# Patient Record
Sex: MALE | Race: OTHER | Employment: FULL TIME | URBAN - METROPOLITAN AREA
[De-identification: names, ages, dates, MRNs, and addresses within clinical notes are randomized per-mention and may not be internally consistent; named-entity substitution may affect disease eponyms.]

---

## 2021-08-19 ENCOUNTER — HOSPITAL ENCOUNTER (OUTPATIENT)
Age: 41
Discharge: HOME | End: 2021-08-19
Payer: COMMERCIAL

## 2021-08-19 VITALS
HEART RATE: 74 BPM | DIASTOLIC BLOOD PRESSURE: 88 MMHG | WEIGHT: 180 LBS | SYSTOLIC BLOOD PRESSURE: 137 MMHG | TEMPERATURE: 98 F | BODY MASS INDEX: 26.66 KG/M2 | HEIGHT: 69 IN | OXYGEN SATURATION: 100 % | RESPIRATION RATE: 18 BRPM

## 2021-08-19 DIAGNOSIS — Z20.822 LAB TEST NEGATIVE FOR COVID-19 VIRUS: ICD-10-CM

## 2021-08-19 DIAGNOSIS — Z20.822 ENCOUNTER FOR LABORATORY TESTING FOR COVID-19 VIRUS: ICD-10-CM

## 2021-08-19 DIAGNOSIS — J01.10 ACUTE FRONTAL SINUSITIS, RECURRENCE NOT SPECIFIED: Primary | ICD-10-CM

## 2021-08-19 LAB
S PYO AG THROAT QL: NEGATIVE
SARS-COV-2 RNA RESP QL NAA+PROBE: NOT DETECTED

## 2021-08-19 PROCEDURE — U0002 COVID-19 LAB TEST NON-CDC: HCPCS | Performed by: NURSE PRACTITIONER

## 2021-08-19 PROCEDURE — 87880 STREP A ASSAY W/OPTIC: CPT | Performed by: NURSE PRACTITIONER

## 2021-08-19 PROCEDURE — 99203 OFFICE O/P NEW LOW 30 MIN: CPT | Performed by: NURSE PRACTITIONER

## 2021-08-19 RX ORDER — PREDNISONE 20 MG/1
20 TABLET ORAL DAILY
Qty: 5 TABLET | Refills: 0 | Status: SHIPPED | OUTPATIENT
Start: 2021-08-19 | End: 2021-08-24

## 2021-08-19 RX ORDER — AMOXICILLIN 875 MG/1
875 TABLET, COATED ORAL 2 TIMES DAILY
Qty: 14 TABLET | Refills: 0 | Status: SHIPPED | OUTPATIENT
Start: 2021-08-19 | End: 2021-08-26

## 2021-08-19 NOTE — ED PROVIDER NOTES
Patient Seen in: [facility]      History   Patient presents with:  Sinus Problem    Stated Complaint: SINUS CONDITION    HPI/Subjective:   Well-appearing 51-year-old male presents with complaints of nasal congestion for the past 3 weeks.   Pa Maxillary sinus tenderness present. Left Sinus: Maxillary sinus tenderness present. Mouth/Throat:      Mouth: Mucous membranes are moist.      Pharynx: Uvula midline. Posterior oropharyngeal erythema present.      Neck: No cervical lymphadenopathy PM    START taking these medications    amoxicillin 875 MG Oral Tab  Take 1 tablet (875 mg total) by mouth 2 (two) times daily for 7 days. , Normal, Disp-14 tablet, R-0    predniSONE 20 MG Oral Tab  Take 1 tablet (20 mg total) by mouth daily for 5 days. , No

## 2021-08-19 NOTE — ED INITIAL ASSESSMENT (HPI)
Pt here with complaint so sinus congestion, headaches and sore throat that has been going on for 2.5 weeks, pt states the last 2 days he developed chest congestion and a cough now, pt denies any fevers

## 2022-01-03 ENCOUNTER — APPOINTMENT (OUTPATIENT)
Dept: GENERAL RADIOLOGY | Age: 42
End: 2022-01-03
Attending: EMERGENCY MEDICINE
Payer: COMMERCIAL

## 2022-01-03 ENCOUNTER — HOSPITAL ENCOUNTER (OUTPATIENT)
Age: 42
Discharge: HOME | End: 2022-01-03
Payer: COMMERCIAL

## 2022-01-03 VITALS
HEIGHT: 69 IN | BODY MASS INDEX: 26.66 KG/M2 | OXYGEN SATURATION: 100 % | DIASTOLIC BLOOD PRESSURE: 90 MMHG | WEIGHT: 180 LBS | HEART RATE: 73 BPM | TEMPERATURE: 98 F | RESPIRATION RATE: 18 BRPM | SYSTOLIC BLOOD PRESSURE: 134 MMHG

## 2022-01-03 DIAGNOSIS — Z20.822 ENCOUNTER FOR LABORATORY TESTING FOR COVID-19 VIRUS: ICD-10-CM

## 2022-01-03 DIAGNOSIS — R06.02 SHORTNESS OF BREATH: Primary | ICD-10-CM

## 2022-01-03 DIAGNOSIS — J02.0 STREPTOCOCCAL SORE THROAT: ICD-10-CM

## 2022-01-03 LAB
#MXD IC: 0.6 X10ˆ3/UL (ref 0.1–1)
BUN BLD-MCNC: 18 MG/DL (ref 7–18)
CHLORIDE BLD-SCNC: 102 MMOL/L (ref 98–112)
CO2 BLD-SCNC: 24 MMOL/L (ref 21–32)
CREAT BLD-MCNC: 0.8 MG/DL
DDIMER WHOLE BLOOD: <200 NG/ML DDU (ref ?–400)
GLUCOSE BLD-MCNC: 96 MG/DL (ref 70–99)
HCT VFR BLD AUTO: 44.5 %
HCT VFR BLD CALC: 48 %
HGB BLD-MCNC: 14.6 G/DL
ISTAT IONIZED CALCIUM FOR CHEM 8: 1.24 MMOL/L (ref 1.12–1.32)
LYMPHOCYTES # BLD AUTO: 2.7 X10ˆ3/UL (ref 1–4)
LYMPHOCYTES NFR BLD AUTO: 36 %
MCH RBC QN AUTO: 29.6 PG (ref 26–34)
MCHC RBC AUTO-ENTMCNC: 32.8 G/DL (ref 31–37)
MCV RBC AUTO: 90.1 FL (ref 80–100)
MIXED CELL %: 7.6 %
NEUTROPHILS # BLD AUTO: 4.2 X10ˆ3/UL (ref 1.5–7.7)
NEUTROPHILS NFR BLD AUTO: 56.4 %
PLATELET # BLD AUTO: 262 X10ˆ3/UL (ref 150–450)
POTASSIUM BLD-SCNC: 3.6 MMOL/L (ref 3.6–5.1)
RBC # BLD AUTO: 4.94 X10ˆ6/UL
S PYO AG THROAT QL: POSITIVE
SODIUM BLD-SCNC: 140 MMOL/L (ref 136–145)
TROPONIN I BLD-MCNC: <0.02 NG/ML
WBC # BLD AUTO: 7.5 X10ˆ3/UL (ref 4–11)

## 2022-01-03 PROCEDURE — 93000 ELECTROCARDIOGRAM COMPLETE: CPT | Performed by: EMERGENCY MEDICINE

## 2022-01-03 PROCEDURE — 80047 BASIC METABLC PNL IONIZED CA: CPT | Performed by: EMERGENCY MEDICINE

## 2022-01-03 PROCEDURE — 85025 COMPLETE CBC W/AUTO DIFF WBC: CPT | Performed by: EMERGENCY MEDICINE

## 2022-01-03 PROCEDURE — 71046 X-RAY EXAM CHEST 2 VIEWS: CPT | Performed by: EMERGENCY MEDICINE

## 2022-01-03 PROCEDURE — 99214 OFFICE O/P EST MOD 30 MIN: CPT | Performed by: EMERGENCY MEDICINE

## 2022-01-03 PROCEDURE — 87880 STREP A ASSAY W/OPTIC: CPT | Performed by: EMERGENCY MEDICINE

## 2022-01-03 PROCEDURE — 84484 ASSAY OF TROPONIN QUANT: CPT | Performed by: EMERGENCY MEDICINE

## 2022-01-03 RX ORDER — PENICILLIN V POTASSIUM 500 MG/1
500 TABLET ORAL 2 TIMES DAILY
Qty: 20 TABLET | Refills: 0 | Status: SHIPPED | OUTPATIENT
Start: 2022-01-03 | End: 2022-01-13

## 2022-01-03 NOTE — ED PROVIDER NOTES
Patient Seen in: [facility]      History   Patient presents with:  Chest Pain Angina  Cough/URI    Stated Complaint: [phone] sob,cough    Subjective:   HPI  Navya Vizcarra is a 39year old  male here for sob and cough for one day.  Last src Temporal   SpO2 100 %   O2 Device None (Room air)       Current:/90   Pulse 73   Temp 97.9 °F (36.6 °C) (Temporal)   Resp 18   Ht 175.3 cm (5' 9\")   Wt 81.6 kg   SpO2 100%   BMI 26.58 kg/m²         Physical Exam  Vitals and nursing note reviewed Psychiatric:         Mood and Affect: Mood is anxious. Behavior: Behavior normal.         Thought Content:  Thought content normal.         Judgment: Judgment normal.               ED Course     Labs Reviewed   POCT RAPID STREP - Abnormal; Notable reflux symptoms discussed with over-the-counter treatment. OTC meds Tylenol 650mg, or Ibuprofen 600mg as directed on the bottle as long as is no contraindication (ulcers, stomach bleeding, stomach bypass, allergy, kidney disease/failure.     Make sure that Prescribed:  Discharge Medication List as of 1/3/2022  6:57 PM    START taking these medications    penicillin v potassium 500 MG Oral Tab  Take 1 tablet (500 mg total) by mouth 2 (two) times a day for 10 days. , Normal, Disp-20 tablet, R-0

## 2022-01-05 LAB — SARS-COV-2 RNA RESP QL NAA+PROBE: NOT DETECTED

## 2022-08-23 ENCOUNTER — HOSPITAL ENCOUNTER (OUTPATIENT)
Age: 42
Discharge: HOME | End: 2022-08-23
Payer: COMMERCIAL

## 2022-08-23 ENCOUNTER — APPOINTMENT (OUTPATIENT)
Dept: GENERAL RADIOLOGY | Age: 42
End: 2022-08-23
Attending: EMERGENCY MEDICINE
Payer: COMMERCIAL

## 2022-08-23 VITALS
DIASTOLIC BLOOD PRESSURE: 77 MMHG | TEMPERATURE: 98 F | RESPIRATION RATE: 18 BRPM | HEART RATE: 74 BPM | OXYGEN SATURATION: 100 % | SYSTOLIC BLOOD PRESSURE: 127 MMHG

## 2022-08-23 DIAGNOSIS — J98.01 BRONCHOSPASM: Primary | ICD-10-CM

## 2022-08-23 DIAGNOSIS — Z20.822 ENCOUNTER FOR LABORATORY TESTING FOR COVID-19 VIRUS: ICD-10-CM

## 2022-08-23 DIAGNOSIS — R05.9 COUGH: ICD-10-CM

## 2022-08-23 LAB
#MXD IC: 0.6 X10ˆ3/UL (ref 0.1–1)
BUN BLD-MCNC: 21 MG/DL (ref 7–18)
CHLORIDE BLD-SCNC: 104 MMOL/L (ref 98–112)
CO2 BLD-SCNC: 24 MMOL/L (ref 21–32)
CREAT BLD-MCNC: 0.9 MG/DL
DDIMER WHOLE BLOOD: <200 NG/ML DDU (ref ?–400)
GFR SERPLBLD BASED ON 1.73 SQ M-ARVRAT: 109 ML/MIN/1.73M2 (ref 60–?)
GLUCOSE BLD-MCNC: 95 MG/DL (ref 70–99)
HCT VFR BLD AUTO: 42.4 %
HCT VFR BLD CALC: 46 %
HGB BLD-MCNC: 14.3 G/DL
ISTAT IONIZED CALCIUM FOR CHEM 8: 1.15 MMOL/L (ref 1.12–1.32)
LYMPHOCYTES # BLD AUTO: 2.3 X10ˆ3/UL (ref 1–4)
LYMPHOCYTES NFR BLD AUTO: 33.1 %
MCH RBC QN AUTO: 30.4 PG (ref 26–34)
MCHC RBC AUTO-ENTMCNC: 33.7 G/DL (ref 31–37)
MCV RBC AUTO: 90 FL (ref 80–100)
MIXED CELL %: 8.7 %
NEUTROPHILS # BLD AUTO: 4.1 X10ˆ3/UL (ref 1.5–7.7)
NEUTROPHILS NFR BLD AUTO: 58.2 %
PLATELET # BLD AUTO: 291 X10ˆ3/UL (ref 150–450)
POTASSIUM BLD-SCNC: 3.6 MMOL/L (ref 3.6–5.1)
RBC # BLD AUTO: 4.71 X10ˆ6/UL
SARS-COV-2 RNA RESP QL NAA+PROBE: NOT DETECTED
SODIUM BLD-SCNC: 138 MMOL/L (ref 136–145)
TROPONIN I BLD-MCNC: <0.02 NG/ML
WBC # BLD AUTO: 7 X10ˆ3/UL (ref 4–11)

## 2022-08-23 PROCEDURE — 94640 AIRWAY INHALATION TREATMENT: CPT | Performed by: EMERGENCY MEDICINE

## 2022-08-23 PROCEDURE — 99214 OFFICE O/P EST MOD 30 MIN: CPT | Performed by: EMERGENCY MEDICINE

## 2022-08-23 PROCEDURE — 93000 ELECTROCARDIOGRAM COMPLETE: CPT | Performed by: EMERGENCY MEDICINE

## 2022-08-23 PROCEDURE — 80047 BASIC METABLC PNL IONIZED CA: CPT | Performed by: EMERGENCY MEDICINE

## 2022-08-23 PROCEDURE — 84484 ASSAY OF TROPONIN QUANT: CPT | Performed by: EMERGENCY MEDICINE

## 2022-08-23 PROCEDURE — 71046 X-RAY EXAM CHEST 2 VIEWS: CPT | Performed by: EMERGENCY MEDICINE

## 2022-08-23 PROCEDURE — 85025 COMPLETE CBC W/AUTO DIFF WBC: CPT | Performed by: EMERGENCY MEDICINE

## 2022-08-23 PROCEDURE — U0002 COVID-19 LAB TEST NON-CDC: HCPCS | Performed by: EMERGENCY MEDICINE

## 2022-08-23 RX ORDER — PREDNISONE 20 MG/1
TABLET ORAL
Qty: 8 TABLET | Refills: 0 | Status: SHIPPED | OUTPATIENT
Start: 2022-08-26 | End: 2022-08-31

## 2022-08-23 RX ORDER — ALBUTEROL SULFATE 90 UG/1
AEROSOL, METERED RESPIRATORY (INHALATION)
Qty: 1 EACH | Refills: 0 | Status: SHIPPED | OUTPATIENT
Start: 2022-08-23

## 2022-08-23 RX ORDER — IPRATROPIUM BROMIDE AND ALBUTEROL SULFATE 2.5; .5 MG/3ML; MG/3ML
3 SOLUTION RESPIRATORY (INHALATION) ONCE
Status: COMPLETED | OUTPATIENT
Start: 2022-08-23 | End: 2022-08-23

## 2023-03-15 ENCOUNTER — HOSPITAL ENCOUNTER (OUTPATIENT)
Age: 43
Discharge: HOME | End: 2023-03-15
Payer: COMMERCIAL

## 2023-03-15 ENCOUNTER — APPOINTMENT (OUTPATIENT)
Dept: GENERAL RADIOLOGY | Age: 43
End: 2023-03-15
Attending: NURSE PRACTITIONER
Payer: COMMERCIAL

## 2023-03-15 VITALS
TEMPERATURE: 97 F | SYSTOLIC BLOOD PRESSURE: 123 MMHG | OXYGEN SATURATION: 100 % | DIASTOLIC BLOOD PRESSURE: 66 MMHG | HEART RATE: 68 BPM | RESPIRATION RATE: 18 BRPM

## 2023-03-15 DIAGNOSIS — S20.212A RIB CONTUSION, LEFT, INITIAL ENCOUNTER: Primary | ICD-10-CM
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
## 2023-03-15 PROCEDURE — 99213 OFFICE O/P EST LOW 20 MIN: CPT | Performed by: NURSE PRACTITIONER

## 2023-03-15 PROCEDURE — 71101 X-RAY EXAM UNILAT RIBS/CHEST: CPT | Performed by: NURSE PRACTITIONER

## 2023-03-15 RX ORDER — LIDOCAINE 4 G/G
2 PATCH TOPICAL EVERY 24 HOURS
Qty: 14 PATCH | Refills: 2 | Status: SHIPPED | OUTPATIENT
Start: 2023-03-15 | End: 2023-03-22

## 2023-03-15 RX ORDER — NAPROXEN 500 MG/1
500 TABLET ORAL 2 TIMES DAILY PRN
Qty: 20 TABLET | Refills: 0 | Status: SHIPPED | OUTPATIENT
Start: 2023-03-15 | End: 2023-03-25

## 2023-03-15 NOTE — ED INITIAL ASSESSMENT (HPI)
Pt came in due to left side rib pain due to fall that occurred 3 weeks ago. Pt stated he felt and heard \"crunching\" sounds from left side ribs. Pt c/o of sharp pain with movement. Pt stated he ha ria when taking a deep breath. Pt has easy non labored respirations.

## 2024-03-14 ENCOUNTER — APPOINTMENT (OUTPATIENT)
Dept: GENERAL RADIOLOGY | Age: 44
End: 2024-03-14
Attending: NURSE PRACTITIONER
Payer: COMMERCIAL

## 2024-03-14 ENCOUNTER — HOSPITAL ENCOUNTER (OUTPATIENT)
Age: 44
Discharge: HOME | End: 2024-03-14
Payer: COMMERCIAL

## 2024-03-14 VITALS
RESPIRATION RATE: 18 BRPM | SYSTOLIC BLOOD PRESSURE: 129 MMHG | TEMPERATURE: 99 F | HEART RATE: 106 BPM | OXYGEN SATURATION: 98 % | DIASTOLIC BLOOD PRESSURE: 86 MMHG

## 2024-03-14 DIAGNOSIS — J20.9 ACUTE BRONCHITIS, UNSPECIFIED ORGANISM: Primary | ICD-10-CM

## 2024-03-14 DIAGNOSIS — R05.1 ACUTE COUGH: ICD-10-CM

## 2024-03-14 PROCEDURE — 71046 X-RAY EXAM CHEST 2 VIEWS: CPT | Performed by: NURSE PRACTITIONER

## 2024-03-14 PROCEDURE — 99213 OFFICE O/P EST LOW 20 MIN: CPT | Performed by: NURSE PRACTITIONER

## 2024-03-14 RX ORDER — BENZONATATE 200 MG/1
200 CAPSULE ORAL 3 TIMES DAILY PRN
Qty: 15 CAPSULE | Refills: 0 | Status: SHIPPED | OUTPATIENT
Start: 2024-03-14

## 2024-03-14 NOTE — ED INITIAL ASSESSMENT (HPI)
Pt dx with Flu B on Monday and negative for Covid. Last night pt cough started and has been getting worse over the last 12 hours. Pain to chest and throat with cough.  Denies fevers.

## 2024-03-14 NOTE — ED PROVIDER NOTES
Patient Seen in: [facility]      History     Chief Complaint   Patient presents with    Cough     Entered by patient     Stated Complaint: Cough    Subjective:   HPI  Patient is an 44-year-old male that presents to the immediate care center today with concern for cough and congestion that worsened last night. He was dx with influenza 3 days ago. Pt denies known illness exposure.  Pt. has been eating and drinking without difficulty.  He has had no shortness of air; no abdominal or back pain; no headache or dizziness.        Objective:   Past Medical History:   Diagnosis Date    Celiac disease (HCC)     Kawasaki disease (HCC)               Past Surgical History:   Procedure Laterality Date    HERNIA SURGERY                  Social History     Socioeconomic History    Marital status:    Tobacco Use    Smoking status: Never    Smokeless tobacco: Never   Substance and Sexual Activity    Alcohol use: Yes     Comment: Social              Review of Systems   Constitutional:  Negative for appetite change, chills and fever.   HENT:  Positive for congestion. Negative for sinus pressure.    Respiratory:  Positive for cough. Negative for shortness of breath.    Cardiovascular:  Negative for chest pain.   Gastrointestinal:  Negative for abdominal pain.   Musculoskeletal:  Negative for arthralgias and myalgias.   Skin:  Negative for rash.   Neurological:  Negative for dizziness, weakness and headaches.       Positive for stated complaint: Cough  Other systems are as noted in HPI.  Constitutional and vital signs reviewed.      All other systems reviewed and negative except as noted above.    Physical Exam     ED Triage Vitals [03/14/24 1403]   /86   Pulse 106   Resp 18   Temp 99.3 °F (37.4 °C)   Temp src Temporal   SpO2 98 %   O2 Device None (Room air)       Current:/86   Pulse 106   Temp 99.3 °F (37.4 °C) (Temporal)   Resp 18   SpO2 98%         Physical Exam  Vitals and nursing note reviewed.    Constitutional:       General: He is not in acute distress.     Appearance: He is not ill-appearing.   HENT:      Head: Normocephalic and atraumatic.      Nose: Nose normal.   Eyes:      Conjunctiva/sclera: Conjunctivae normal.   Pulmonary:      Effort: Pulmonary effort is normal. No respiratory distress.      Breath sounds: Normal breath sounds.   Musculoskeletal:      Cervical back: Normal range of motion and neck supple.   Skin:     General: Skin is warm and dry.      Findings: No rash.   Neurological:      Mental Status: He is alert and oriented to person, place, and time.               ED Course   Labs Reviewed - No data to display       XR CHEST PA + LAT CHEST (CPT=71046)   Final Result   PROCEDURE: XR CHEST PA + LAT CHEST (CPT=71046)       COMPARISON: [facility] in [place], XR RIBS WITH CHEST (3    VIEWS), LEFT (CPT=71101), 3/15/2023, 8:54 AM.  [facility]    in [place], XR CHEST PA + LAT CHEST (XBS=88298), 8/23/2022, 3:00 PM.     [facility], XR CHEST    PA + LAT CHEST (GIC=79505), 1/03/2022, 6:05 PM.       INDICATIONS: Cough x 3 days.       TECHNIQUE:   Two views.         FINDINGS:    CARDIAC/VASC: The cardiomediastinal silhouette is within normal limits of    size.   MEDIAST/ITZ: No visible mass or adenopathy.    LUNGS/PLEURA: No focal opacity, pleural effusion, or pneumothorax.  There    is no evidence of pulmonary edema.   BONES: No fracture or visible bony lesion.    OTHER: Negative.                     =====   CONCLUSION:        No focal opacity, pleural effusion, or pneumothorax           Dictated by (CST): David Ignacio MD on 3/14/2024 at 2:35 PM        Finalized by (CST): David Ignacio MD on 3/14/2024 at 2:35 PM                               MDM                                         Medical Decision Making  Differential diagnoses considered included, but are not exclusive of: bacterial vs viral sinusitis, dehydration, pneumonia, influenza,  Covid-19 infection, and other viral upper respiratory infection.       Problems Addressed:  Acute bronchitis, unspecified organism: self-limited or minor problem    Amount and/or Complexity of Data Reviewed  Radiology:  Decision-making details documented in ED Course.    Risk  OTC drugs.  Prescription drug management.        Disposition and Plan     Clinical Impression:  1. Acute bronchitis, unspecified organism    2. Acute cough         Disposition:  Discharge  3/14/2024  2:51 pm    Follow-up:  Your primary care provider  Call to schedule appointment to be seen in 5-7 days.    As needed    Annemarie Vasquez MD  [address] [address]  [phone]      As needed          Medications Prescribed:  Current Discharge Medication List        START taking these medications    Details   benzonatate 200 MG Oral Cap Take 1 capsule (200 mg total) by mouth 3 (three) times daily as needed for cough.  Qty: 15 capsule, Refills: 0      Dextromethorphan-guaiFENesin  MG Oral Cap Take 1 tablet by mouth every 12 (twelve) hours as needed (cough and congestion).  Qty: 168 capsule, Refills: 0

## 2025-01-28 ENCOUNTER — APPOINTMENT (OUTPATIENT)
Dept: GENERAL RADIOLOGY | Facility: HOSPITAL | Age: 45
End: 2025-01-28
Attending: EMERGENCY MEDICINE
Payer: COMMERCIAL

## 2025-01-28 ENCOUNTER — APPOINTMENT (OUTPATIENT)
Dept: INTERVENTIONAL RADIOLOGY/VASCULAR | Facility: HOSPITAL | Age: 45
End: 2025-01-28
Attending: INTERNAL MEDICINE
Payer: COMMERCIAL

## 2025-01-28 ENCOUNTER — HOSPITAL ENCOUNTER (OUTPATIENT)
Age: 45
Discharge: ANOTHER HOSPITAL | End: 2025-01-28
Payer: COMMERCIAL

## 2025-01-28 ENCOUNTER — HOSPITAL ENCOUNTER (INPATIENT)
Facility: HOSPITAL | Age: 45
LOS: 1 days | Discharge: HOME | End: 2025-01-29
Attending: EMERGENCY MEDICINE | Admitting: HOSPITALIST
Payer: COMMERCIAL

## 2025-01-28 ENCOUNTER — HOSPITAL ENCOUNTER (OUTPATIENT)
Facility: HOSPITAL | Age: 45
Setting detail: OBSERVATION
Discharge: HOME | End: 2025-01-29
Attending: EMERGENCY MEDICINE | Admitting: HOSPITALIST
Payer: COMMERCIAL

## 2025-01-28 VITALS
HEART RATE: 65 BPM | SYSTOLIC BLOOD PRESSURE: 110 MMHG | DIASTOLIC BLOOD PRESSURE: 57 MMHG | OXYGEN SATURATION: 100 % | TEMPERATURE: 98 F | RESPIRATION RATE: 16 BRPM

## 2025-01-28 DIAGNOSIS — R07.9 ACUTE CHEST PAIN: Primary | ICD-10-CM

## 2025-01-28 DIAGNOSIS — I51.9 LV DYSFUNCTION: ICD-10-CM

## 2025-01-28 DIAGNOSIS — I25.10 CAD S/P PERCUTANEOUS CORONARY ANGIOPLASTY: ICD-10-CM

## 2025-01-28 DIAGNOSIS — Z98.61 CAD S/P PERCUTANEOUS CORONARY ANGIOPLASTY: ICD-10-CM

## 2025-01-28 DIAGNOSIS — R07.9 CHEST PAIN OF UNCERTAIN ETIOLOGY: Primary | ICD-10-CM

## 2025-01-28 DIAGNOSIS — R94.31 ABNORMAL EKG: ICD-10-CM

## 2025-01-28 DIAGNOSIS — I25.2 HISTORY OF ST ELEVATION MYOCARDIAL INFARCTION (STEMI): ICD-10-CM

## 2025-01-28 LAB
ALBUMIN SERPL-MCNC: 5 G/DL (ref 3.2–4.8)
ALBUMIN/GLOB SERPL: 1.9 {RATIO} (ref 1–2)
ALP LIVER SERPL-CCNC: 73 U/L
ALT SERPL-CCNC: 21 U/L
ANION GAP SERPL CALC-SCNC: 12 MMOL/L (ref 0–18)
APTT PPP: 28.5 SECONDS (ref 23–36)
AST SERPL-CCNC: 18 U/L (ref ?–34)
BASOPHILS # BLD AUTO: 0.04 X10(3) UL (ref 0–0.2)
BASOPHILS NFR BLD AUTO: 0.4 %
BILIRUB SERPL-MCNC: 0.7 MG/DL (ref 0.3–1.2)
BUN BLD-MCNC: 15 MG/DL (ref 9–23)
BUN/CREAT SERPL: 14.3 (ref 10–20)
CALCIUM BLD-MCNC: 9.8 MG/DL (ref 8.7–10.4)
CHLORIDE SERPL-SCNC: 103 MMOL/L (ref 98–112)
CO2 SERPL-SCNC: 22 MMOL/L (ref 21–32)
CREAT BLD-MCNC: 1.05 MG/DL
DEPRECATED RDW RBC AUTO: 39 FL (ref 35.1–46.3)
EGFRCR SERPLBLD CKD-EPI 2021: 90 ML/MIN/1.73M2 (ref 60–?)
EOSINOPHIL # BLD AUTO: 0.15 X10(3) UL (ref 0–0.7)
EOSINOPHIL NFR BLD AUTO: 1.6 %
ERYTHROCYTE [DISTWIDTH] IN BLOOD BY AUTOMATED COUNT: 11.9 % (ref 11–15)
FLUAV + FLUBV RNA SPEC NAA+PROBE: NEGATIVE
FLUAV + FLUBV RNA SPEC NAA+PROBE: NEGATIVE
GLOBULIN PLAS-MCNC: 2.7 G/DL (ref 2–3.5)
GLUCOSE BLD-MCNC: 130 MG/DL (ref 70–99)
HCT VFR BLD AUTO: 44.8 %
HGB BLD-MCNC: 15.3 G/DL
IMM GRANULOCYTES # BLD AUTO: 0.02 X10(3) UL (ref 0–1)
IMM GRANULOCYTES NFR BLD: 0.2 %
LYMPHOCYTES # BLD AUTO: 3.33 X10(3) UL (ref 1–4)
LYMPHOCYTES NFR BLD AUTO: 35.7 %
MCH RBC QN AUTO: 30.4 PG (ref 26–34)
MCHC RBC AUTO-ENTMCNC: 34.2 G/DL (ref 31–37)
MCV RBC AUTO: 88.9 FL
MONOCYTES # BLD AUTO: 0.75 X10(3) UL (ref 0.1–1)
MONOCYTES NFR BLD AUTO: 8 %
NEUTROPHILS # BLD AUTO: 5.05 X10 (3) UL (ref 1.5–7.7)
NEUTROPHILS # BLD AUTO: 5.05 X10(3) UL (ref 1.5–7.7)
NEUTROPHILS NFR BLD AUTO: 54.1 %
OSMOLALITY SERPL CALC.SUM OF ELEC: 287 MOSM/KG (ref 275–295)
PLATELET # BLD AUTO: 238 10(3)UL (ref 150–450)
POTASSIUM SERPL-SCNC: 3.6 MMOL/L (ref 3.5–5.1)
PROT SERPL-MCNC: 7.7 G/DL (ref 5.7–8.2)
RBC # BLD AUTO: 5.04 X10(6)UL
RSV RNA SPEC NAA+PROBE: NEGATIVE
SARS-COV-2 RNA RESP QL NAA+PROBE: NOT DETECTED
SODIUM SERPL-SCNC: 137 MMOL/L (ref 136–145)
TROPONIN I SERPL HS-MCNC: 31 NG/L
TROPONIN I SERPL HS-MCNC: 34 NG/L
TROPONIN I SERPL HS-MCNC: 45 NG/L
WBC # BLD AUTO: 9.3 X10(3) UL (ref 4–11)

## 2025-01-28 PROCEDURE — 99215 OFFICE O/P EST HI 40 MIN: CPT | Performed by: PHYSICIAN ASSISTANT

## 2025-01-28 PROCEDURE — 93000 ELECTROCARDIOGRAM COMPLETE: CPT | Performed by: PHYSICIAN ASSISTANT

## 2025-01-28 PROCEDURE — 99222 1ST HOSP IP/OBS MODERATE 55: CPT | Performed by: HOSPITALIST

## 2025-01-28 PROCEDURE — 71045 X-RAY EXAM CHEST 1 VIEW: CPT | Performed by: EMERGENCY MEDICINE

## 2025-01-28 RX ORDER — METOPROLOL SUCCINATE 50 MG/1
25 TABLET, EXTENDED RELEASE ORAL DAILY
COMMUNITY
Start: 2025-01-16

## 2025-01-28 RX ORDER — SPIRONOLACTONE 25 MG/1
25 TABLET ORAL DAILY
COMMUNITY

## 2025-01-28 RX ORDER — HEPARIN SODIUM 1000 [USP'U]/ML
INJECTION, SOLUTION INTRAVENOUS; SUBCUTANEOUS
Status: DISCONTINUED
Start: 2025-01-28 | End: 2025-01-28 | Stop reason: WASHOUT

## 2025-01-28 RX ORDER — TEMAZEPAM 15 MG/1
15 CAPSULE ORAL NIGHTLY PRN
Status: DISCONTINUED | OUTPATIENT
Start: 2025-01-28 | End: 2025-01-29

## 2025-01-28 RX ORDER — HEPARIN SODIUM AND DEXTROSE 10000; 5 [USP'U]/100ML; G/100ML
12 INJECTION INTRAVENOUS ONCE
Status: COMPLETED | OUTPATIENT
Start: 2025-01-28 | End: 2025-01-29

## 2025-01-28 RX ORDER — HEPARIN SODIUM 1000 [USP'U]/ML
60 INJECTION, SOLUTION INTRAVENOUS; SUBCUTANEOUS ONCE
Status: COMPLETED | OUTPATIENT
Start: 2025-01-28 | End: 2025-01-28

## 2025-01-28 RX ORDER — PRASUGREL 10 MG/1
10 TABLET, FILM COATED ORAL DAILY
Status: DISCONTINUED | OUTPATIENT
Start: 2025-01-29 | End: 2025-01-29

## 2025-01-28 RX ORDER — SPIRONOLACTONE 25 MG/1
25 TABLET ORAL DAILY
Status: DISCONTINUED | OUTPATIENT
Start: 2025-01-29 | End: 2025-01-29

## 2025-01-28 RX ORDER — HEPARIN SODIUM AND DEXTROSE 10000; 5 [USP'U]/100ML; G/100ML
INJECTION INTRAVENOUS CONTINUOUS
Status: DISCONTINUED | OUTPATIENT
Start: 2025-01-28 | End: 2025-01-29

## 2025-01-28 RX ORDER — MIDAZOLAM HYDROCHLORIDE 1 MG/ML
INJECTION INTRAMUSCULAR; INTRAVENOUS
Status: DISCONTINUED
Start: 2025-01-28 | End: 2025-01-28 | Stop reason: WASHOUT

## 2025-01-28 RX ORDER — ASPIRIN 81 MG/1
81 TABLET, CHEWABLE ORAL DAILY
COMMUNITY

## 2025-01-28 RX ORDER — METOPROLOL SUCCINATE 25 MG/1
25 TABLET, EXTENDED RELEASE ORAL DAILY
Status: DISCONTINUED | OUTPATIENT
Start: 2025-01-29 | End: 2025-01-29

## 2025-01-28 RX ORDER — PROCHLORPERAZINE EDISYLATE 5 MG/ML
5 INJECTION INTRAMUSCULAR; INTRAVENOUS EVERY 8 HOURS PRN
Status: DISCONTINUED | OUTPATIENT
Start: 2025-01-28 | End: 2025-01-29

## 2025-01-28 RX ORDER — ATORVASTATIN CALCIUM 80 MG/1
80 TABLET, FILM COATED ORAL NIGHTLY
COMMUNITY

## 2025-01-28 RX ORDER — ATORVASTATIN CALCIUM 80 MG/1
80 TABLET, FILM COATED ORAL NIGHTLY
Status: DISCONTINUED | OUTPATIENT
Start: 2025-01-28 | End: 2025-01-29

## 2025-01-28 RX ORDER — PRASUGREL 10 MG/1
10 TABLET, FILM COATED ORAL DAILY
COMMUNITY

## 2025-01-28 RX ORDER — ASPIRIN 81 MG/1
324 TABLET, CHEWABLE ORAL ONCE
Status: COMPLETED | OUTPATIENT
Start: 2025-01-28 | End: 2025-01-28

## 2025-01-28 RX ORDER — ONDANSETRON 2 MG/ML
4 INJECTION INTRAMUSCULAR; INTRAVENOUS EVERY 6 HOURS PRN
Status: DISCONTINUED | OUTPATIENT
Start: 2025-01-28 | End: 2025-01-29

## 2025-01-28 RX ORDER — ACETAMINOPHEN 500 MG
500 TABLET ORAL EVERY 4 HOURS PRN
Status: DISCONTINUED | OUTPATIENT
Start: 2025-01-28 | End: 2025-01-29

## 2025-01-28 RX ORDER — ASPIRIN 81 MG/1
81 TABLET, CHEWABLE ORAL DAILY
Status: DISCONTINUED | OUTPATIENT
Start: 2025-01-29 | End: 2025-01-29

## 2025-01-28 NOTE — ED PROVIDER NOTES
Patient Seen in: [facility]      History     Chief Complaint   Patient presents with    Chest Pain     Entered by patient     Stated Complaint: Chest Pain    Subjective:   HPI    Is a 44-year-old male with history of Kawasaki's disease, celiac disease, coronary artery disease with recent STEMI in [place] 1/2025 status post PCI, left ventricular dysfunction, presenting to immediate care for evaluation of chest pain.  Onset: Today.  Sharp pain.  Midsternal left-sided.  Associated: sensation of feeling fatigue and unwell 4 days ago.  Coming to immediate care for evaluation of chest pain.  Denies any lightheadedness or dizziness or confusion.  Denies current chest pain or shortness of breath.  No diaphoresis.  No nausea or vomiting.  No weakness or confusion.  Compliance with her medication including anticoagulant.  Did not notify cardiologist or primary regarding symptoms.  Here for initial evaluation.      Objective:     Past Medical History:    Celiac disease (HCC)    Heart attack (HCC)    Kawasaki disease (HCC)              Past Surgical History:   Procedure Laterality Date    Hernia surgery                  No pertinent social history.            Review of Systems   Constitutional:  Positive for fatigue. Negative for chills and fever.   Respiratory:  Negative for shortness of breath.    Cardiovascular:  Positive for chest pain. Negative for palpitations and leg swelling.   Gastrointestinal:  Negative for abdominal pain, nausea and vomiting.   Musculoskeletal:  Negative for back pain.   Allergic/Immunologic: Negative for immunocompromised state.   Neurological:  Negative for dizziness, light-headedness and headaches.   Psychiatric/Behavioral:  Negative for confusion.        Positive for stated complaint: Chest Pain  Other systems are as noted in HPI.  Constitutional and vital signs reviewed.      All other systems reviewed and negative except as noted above.    Physical Exam     ED Triage Vitals  [01/28/25 1619]   /57   Pulse 65   Resp 16   Temp 97.7 °F (36.5 °C)   Temp src Oral   SpO2 100 %   O2 Device None (Room air)       Current Vitals:   Vital Signs  BP: 110/57  Pulse: 65  Resp: 16  Temp: 97.7 °F (36.5 °C)  Temp src: Oral    Oxygen Therapy  SpO2: 100 %  O2 Device: None (Room air)        Physical Exam  Vitals and nursing note reviewed.   Constitutional:       General: He is not in acute distress.     Appearance: Normal appearance. He is well-developed. He is not ill-appearing, toxic-appearing or diaphoretic.   HENT:      Head: Normocephalic and atraumatic.      Mouth/Throat:      Mouth: Mucous membranes are moist.   Eyes:      Conjunctiva/sclera: Conjunctivae normal.   Cardiovascular:      Rate and Rhythm: Normal rate and regular rhythm.      Pulses: Normal pulses.   Pulmonary:      Effort: Pulmonary effort is normal. No respiratory distress.   Musculoskeletal:         General: No deformity. Normal range of motion.      Cervical back: Normal range of motion. No rigidity.      Right lower leg: No tenderness. No edema.      Left lower leg: No tenderness. No edema.   Neurological:      General: No focal deficit present.      Mental Status: He is alert and oriented to person, place, and time.      Motor: No weakness.      Gait: Gait normal.   Psychiatric:         Mood and Affect: Mood normal.         Behavior: Behavior normal.           ED Course   Labs Reviewed - No data to display  EKG    Rate, intervals and axes as noted on EKG Report.  Rate: 59  Rhythm: Sinus bradycardia  Reading: Sinus bradycardia, 59 bpm, no STEMI, no block QTc, anterior septal infarct, T wave inversion inferior leads, age undetermined.   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 Prior pt of Dr. Krishnan.    Pt states he was in [place] 1/2025 on vacation, started having chest pain, felt weak an hour later, then had n/v, diaphoresis. Went to 1 ED, had EKG showing STEMI and then transferred to a PCI capable facility.  Had [place] chrome 3.5 x 32 stent to ostial/prox LAD and 3.5 x 18mm [place] Chrome to OM 1  Had post PCI TTE with w/ EF 45%. Does not seem to have any significant valvular disease but TTE report in Japanese.    D/c meds include:  Plavix 75mg qd   Aspirim 100mg qd  Espironolactona 25mg qd  Famotidina 20mg  Neparvis 50mg (sacultitrilo valsartan)  Lipitor 80mg qd  Lopressor 50mg qd        MDM     Patient is 44-year-old male with recent STEMI status post PCI with angioplasty, presenting to immediate care for evaluation of acute chest pain for 1 day with associated of feeling unwell for the last 4 days.  Patient is hemodynamically stable.  Nontachycardic not hypoxic.  Normal tensive.  Appears no acute distress.  Initial evaluation with EKG sinus bradycardia, T wave inversions in V1-V5.  Patient with history of recent PCI with STEMI with occlusion ostial, proximal LAD, OM.  Discussed limitations of immediate care evaluation.  Patient with multiple cardiac risk factors including recent STEMI with current stent placement, acute chest pain, LV dysfunction, history of Kawasaki's.  Requires ER evaluation for acute symptoms.  Patient declining EMS transport.  Patient aware of increased risk of organ dysfunction including sudden death if does not receive prompt evaluation. Patient will go to [facility] hospital for evaluation of symptoms as it is closest to his home.  Discussed calling his cardiologist probably regarding symptoms and requiring prompt ER evaluation      Medical Decision Making      Disposition and Plan     Clinical Impression:  1. Acute chest pain    2. LV dysfunction    3. History of ST elevation  myocardial infarction (STEMI)    4. CAD S/P percutaneous coronary angioplasty    5. Abnormal EKG         Disposition:  Ic to ed  1/28/2025  4:45 pm    Follow-up:  No follow-up provider specified.        Medications Prescribed:  Current Discharge Medication List              Supplementary Documentation:

## 2025-01-28 NOTE — ED PROVIDER NOTES
Patient Seen in: [facility] Emergency Department    History   No chief complaint on file.      HPI    History is provided by patient/independent historian: Patient  44 year old male with history of MI, with stent placement at outside hospital in January, on prasugrel, aspirin, Entresto, here with complaints of intermittent chest pain for the past 3 days.  Today he felt a tightness in his throat.  He went to immediate care where he was recommended to come in by EMS for evaluation, but declined and decided to drive himself here.  He currently has 0 out of 10 pain.    History reviewed.   Past Medical History:    Celiac disease (HCC)    Heart attack (HCC)    Kawasaki disease (HCC)         History reviewed.   Past Surgical History:   Procedure Laterality Date    Hernia surgery           Home Medications reviewed :  Prescriptions Prior to Admission[1]      History reviewed.   Social History     Socioeconomic History    Marital status:    Tobacco Use    Smoking status: Never    Smokeless tobacco: Never   Vaping Use    Vaping status: Never Used   Substance and Sexual Activity    Alcohol use: Yes     Comment: Social    Drug use: Not Currently         ROS  Review of Systems   HENT:          Throat tightness   Respiratory:  Negative for shortness of breath.    Cardiovascular:  Positive for chest pain.   All other systems reviewed and are negative.     All other pertinent organ systems are reviewed and are negative.      Physical Exam     ED Triage Vitals   BP 01/28/25 1721 122/89   Pulse 01/28/25 1721 118   Resp 01/28/25 1721 15   Temp 01/28/25 1728 98.1 °F (36.7 °C)   Temp src 01/28/25 1728 Temporal   SpO2 01/28/25 1721 98 %   O2 Device 01/28/25 1715 None (Room air)     Vital signs reviewed.      Physical Exam  Vitals and nursing note reviewed.   Cardiovascular:      Pulses: Normal pulses.   Pulmonary:      Effort: No respiratory distress.   Abdominal:      General: There is no distension.   Neurological:       Mental Status: He is alert.         ED Course       Labs:     Labs Reviewed   COMP METABOLIC PANEL (14) - Abnormal; Notable for the following components:       Result Value    Glucose 130 (*)     Albumin 5.0 (*)     All other components within normal limits   TROPONIN I HIGH SENSITIVITY - Normal   PTT, ACTIVATED - Normal   SARS-COV-2/FLU A AND B/RSV BY PCR (GENEXPERT) - Normal    Narrative:     This test is intended for the qualitative detection and differentiation of SARS-CoV-2, influenza A, influenza B, and respiratory syncytial virus (RSV) viral RNA in nasopharyngeal or nares swabs from individuals suspected of respiratory viral infection consistent with COVID-19 by their healthcare provider. Signs and symptoms of respiratory viral infection due to SARS-CoV-2, influenza, and RSV can be similar.                                    Test performed using the Xpert Xpress SARS-CoV-2/FLU/RSV (real time RT-PCR)  assay on the [employer] instrument, [address], [place] [address].                   This test is being used under the Food and Drug Administration's Emergency Use Authorization.                                    The authorized Fact Sheet for Healthcare Providers for this assay is available upon request from the laboratory.   CBC WITH DIFFERENTIAL WITH PLATELET   RAINBOW DRAW BLUE   ED/MRSA SCREEN BY PCR-CC         My EKG Interpretation: EKG    Rate, intervals and axes as noted on EKG Report.  Rate: 103  Rhythm: Sinus Rhythm PVC  Reading: abnormal for rate, abnormal for rhythm, ST depression inferolaterally           As reviewed and Interpreted by me      Imaging Results Available and Reviewed while in ED:   No results found.  My review and independent interpretation of xr images: no infiltrate. Radiology report corroborates this in addition to other details as reported by them.      Decision rules/scores evaluated: none      Diagnostic labs/tests considered but not ordered: none    ED Medications Administered:    Medications   heparin (Porcine) 39889 units/250mL infusion ED INITIAL DOSE (12 Units/kg/hr × 78.2 kg Intravenous New Bag 1/28/25 1800)   heparin (Porcine) 50747 units/250mL infusion ACS/AFIB CONTINUOUS (has no administration in time range)   aspirin chewable tab 324 mg (324 mg Oral Given 1/28/25 1733)   heparin (Porcine) 1000 UNIT/ML injection - BOLUS IV 4,700 Units (4,700 Units Intravenous Given 1/28/25 1759)                MDM       Medical Decision Making      Differential Diagnosis: After obtaining the patient's history, performing the physical exam and reviewing the diagnostics, multiple initial diagnoses were considered based on the presenting problem including STEmi, Anxiety, viral syndrome, influenza    External document review: I personally reviewed available external medical records for any recent pertinent discharge summaries, testing, and procedures - the findings are as follows: today visit with BOO Plummer at  for chest pain    Complicating Factors: The patient already  has a past medical history of Celiac disease (HCC), Heart attack (HCC), and Kawasaki disease (HCC). to contribute to the complexity of this ED evaluation.    Procedures performed: none    Discussed management with physician/appropriate source: Dr. Lennon - agrees with cancelling cardiac alert, recommending heparin gtt and admission, Dr. Colon    Considered admission/deescalation of care for: chest pain    Social determinants of health affecting patient care: none    Prescription medications considered: discussed continuing current medication regimen    The patient requires continuous monitoring for: chest pain    Shared decision making: discussed possible admission    Critical Care Time:  Total critical care time for this patient was 30 minutes exclusive of separately billable procedures, but in obtaining history, performing a physical exam, bedside monitoring of interventions, collecting and interpreting test, and discussion  with consultants.        Disposition and Plan     Clinical Impression:  1. Chest pain of uncertain etiology        Disposition:  Admit    Follow-up:  No follow-up provider specified.    Medications Prescribed:  Current Discharge Medication List          Hospital Problems       Present on Admission  Date Reviewed: 3/14/2024            ICD-10-CM Noted POA    * (Principal) Chest pain of uncertain etiology R07.9 1/28/2025 Unknown                     [1] (Not in a hospital admission)

## 2025-01-28 NOTE — ED INITIAL ASSESSMENT (HPI)
Pt states that he felt anxious the last three days. No chest pain. 2 stents placed on January 2nd of 2025 and MI. Pt denies any chest pain, SOB, and dizziness

## 2025-01-28 NOTE — ED INITIAL ASSESSMENT (HPI)
Pt came in due to mid-sternal chest pain for the past 3 days. Pt has hx of recent STEMI. Pt has easy non labored respirations.

## 2025-01-29 ENCOUNTER — APPOINTMENT (OUTPATIENT)
Dept: CV DIAGNOSTICS | Facility: HOSPITAL | Age: 45
End: 2025-01-29
Attending: HOSPITALIST
Payer: COMMERCIAL

## 2025-01-29 VITALS
DIASTOLIC BLOOD PRESSURE: 70 MMHG | HEIGHT: 69 IN | SYSTOLIC BLOOD PRESSURE: 105 MMHG | TEMPERATURE: 99 F | BODY MASS INDEX: 25.53 KG/M2 | WEIGHT: 172.38 LBS | RESPIRATION RATE: 17 BRPM | HEART RATE: 90 BPM | OXYGEN SATURATION: 99 %

## 2025-01-29 LAB
ANION GAP SERPL CALC-SCNC: 11 MMOL/L (ref 0–18)
APTT PPP: 53.4 SECONDS (ref 23–36)
APTT PPP: 53.8 SECONDS (ref 23–36)
ATRIAL RATE: 103 BPM
ATRIAL RATE: 59 BPM
BASOPHILS # BLD AUTO: 0.04 X10(3) UL (ref 0–0.2)
BASOPHILS NFR BLD AUTO: 0.6 %
BUN BLD-MCNC: 12 MG/DL (ref 9–23)
BUN/CREAT SERPL: 13.5 (ref 10–20)
CALCIUM BLD-MCNC: 9.8 MG/DL (ref 8.7–10.4)
CHLORIDE SERPL-SCNC: 107 MMOL/L (ref 98–112)
CO2 SERPL-SCNC: 23 MMOL/L (ref 21–32)
CREAT BLD-MCNC: 0.89 MG/DL
DEPRECATED RDW RBC AUTO: 38.6 FL (ref 35.1–46.3)
EGFRCR SERPLBLD CKD-EPI 2021: 108 ML/MIN/1.73M2 (ref 60–?)
EOSINOPHIL # BLD AUTO: 0.12 X10(3) UL (ref 0–0.7)
EOSINOPHIL NFR BLD AUTO: 1.7 %
ERYTHROCYTE [DISTWIDTH] IN BLOOD BY AUTOMATED COUNT: 11.7 % (ref 11–15)
GLUCOSE BLD-MCNC: 102 MG/DL (ref 70–99)
HCT VFR BLD AUTO: 42.8 %
HGB BLD-MCNC: 14.3 G/DL
IMM GRANULOCYTES # BLD AUTO: 0.02 X10(3) UL (ref 0–1)
IMM GRANULOCYTES NFR BLD: 0.3 %
LYMPHOCYTES # BLD AUTO: 2.58 X10(3) UL (ref 1–4)
LYMPHOCYTES NFR BLD AUTO: 35.5 %
MCH RBC QN AUTO: 30 PG (ref 26–34)
MCHC RBC AUTO-ENTMCNC: 33.4 G/DL (ref 31–37)
MCV RBC AUTO: 89.9 FL
MONOCYTES # BLD AUTO: 0.56 X10(3) UL (ref 0.1–1)
MONOCYTES NFR BLD AUTO: 7.7 %
MRSA DNA SPEC QL NAA+PROBE: NEGATIVE
NEUTROPHILS # BLD AUTO: 3.94 X10 (3) UL (ref 1.5–7.7)
NEUTROPHILS # BLD AUTO: 3.94 X10(3) UL (ref 1.5–7.7)
NEUTROPHILS NFR BLD AUTO: 54.2 %
OSMOLALITY SERPL CALC.SUM OF ELEC: 292 MOSM/KG (ref 275–295)
P AXIS: 53 DEGREES
P AXIS: 70 DEGREES
P-R INTERVAL: 172 MS
P-R INTERVAL: 176 MS
PLATELET # BLD AUTO: 211 10(3)UL (ref 150–450)
POTASSIUM SERPL-SCNC: 3.8 MMOL/L (ref 3.5–5.1)
Q-T INTERVAL: 368 MS
Q-T INTERVAL: 450 MS
QRS DURATION: 98 MS
QRS DURATION: 98 MS
QTC CALCULATION (BEZET): 445 MS
QTC CALCULATION (BEZET): 482 MS
R AXIS: -26 DEGREES
R AXIS: 10 DEGREES
RBC # BLD AUTO: 4.76 X10(6)UL
SODIUM SERPL-SCNC: 141 MMOL/L (ref 136–145)
T AXIS: 103 DEGREES
T AXIS: 91 DEGREES
TROPONIN I SERPL HS-MCNC: 36 NG/L
VENTRICULAR RATE: 103 BPM
VENTRICULAR RATE: 59 BPM
WBC # BLD AUTO: 7.3 X10(3) UL (ref 4–11)

## 2025-01-29 PROCEDURE — 99239 HOSP IP/OBS DSCHRG MGMT >30: CPT | Performed by: INTERNAL MEDICINE

## 2025-01-29 PROCEDURE — 93306 TTE W/DOPPLER COMPLETE: CPT | Performed by: HOSPITALIST

## 2025-01-29 RX ORDER — METOPROLOL SUCCINATE 25 MG/1
25 TABLET, EXTENDED RELEASE ORAL DAILY
Status: DISCONTINUED | OUTPATIENT
Start: 2025-01-29 | End: 2025-01-29

## 2025-01-29 NOTE — ED QUICK NOTES
Orders for admission, patient is aware of plan and ready to go upstairs. Any questions, please call ED RN Carolina at extension [phone].     Patient Covid vaccination status: Unvaccinated     COVID Test Ordered in ED: Rapid SARS-CoV-2 by PCR    COVID Suspicion at Admission: N/A    Running Infusions:    continuous dose heparin          Mental Status/LOC at time of transport: a&ox4    Other pertinent information:   CIWA score: N/A   NIH score:  N/A

## 2025-01-29 NOTE — CONSULTS
Cardiology Consultation  [facility]    Brian Gary Patient Status:  Inpatient    1980 MRN [id number]   Location [facility] 2W/SW Attending Kamlesh Todd MD   Hosp Day # 1 PCP Ling Ramos     Reason for Consultation:  CAD    History of Present Illness:  Brian Gary is a a(n) 44 year old male with pmh Kawasaki disease, celiac disease and CAD s/p recent STEMI 2025 while vacationing in [place] s/p PCI LAD and OM presenting with multiple complaints.  Patient states for the past several days he has been feeling generally anxious with associated elevated BP.  Notes sensation of frog in his throat yesterday.  States symptoms at time of MI were central chest squeezing/tightness with radiation to jaw and left arm with associated diaphoresis and nausea.  States current symptoms are completely different than symptoms at time of MI.  Denies chest pain, palpitations, dyspnea, orthopnea, PND or LE edema.  Nonsmoker.  No family h/o premature CAD.    On admission patient afeb, , /89.  Trop negative x 4, ECG with stigma of recent MI.  Cardial alert initially called and then canceled, cardiology consulted for further eval and management.     History:  Past Medical History:    Celiac disease (HCC)    Heart attack (HCC)    Kawasaki disease (HCC)     Past Surgical History:   Procedure Laterality Date    Hernia surgery       Family History   Problem Relation Age of Onset    Kidney Disease Mother       reports that he has never smoked. He has never used smokeless tobacco. He reports current alcohol use. He reports that he does not currently use drugs.    Allergies:  Allergies[1]    Medications:  Medications Ordered Prior to Encounter[2]    Review of Systems:  Constitutional: denies fevers, chills, night sweats  HEENT: denies headache, vision changes, trouble or pain with swallowing  Cardiac: denies chest pain, palpitations, edema  Pulm: denies dyspnea, cough, wheeze  GI: denies n/v, abd pain,  diarrhea or constipation  : denies hematuria, dysuria, incontinence  MSK: denies muscle or joint pains  Neuro: denies numbness, weakness, paresthesias  Psych: + anxiety  Integument: denies skin rashes or lesions  Heme: denies easy bruising or bleeding  Endo: denies heat/cold intolerance, skin or nail changes      Physical Exam:  Blood pressure 112/78, pulse 71, temperature 98.6 °F (37 °C), temperature source Temporal, resp. rate 14, height 5' 9\" (1.753 m), weight 172 lb 6.4 oz (78.2 kg), SpO2 95%.  Wt Readings from Last 3 Encounters:   01/28/25 172 lb 6.4 oz (78.2 kg)   01/03/22 180 lb (81.6 kg)   08/19/21 180 lb (81.6 kg)       General: awake, alert, oriented x 3, no acute distress  HEENT: at/nc, perrl, eomi  Neck: No JVD, carotids 2+ no bruits.  Cardiac: Regular rate and rhythm, S1, S2 normal, no murmur, rub or gallop.  Lungs: Clear without wheezes, rales, rhonchi or dullness.  Normal excursions and effort.  Abdomen: Soft, non-tender, non-distended, normal bowel sounds   Extremities: Without clubbing, cyanosis or edema.  Peripheral pulses are 2+.  Neurologic: Alert and oriented, normal affect.  Psych: normal mood and affect  Skin: Warm and dry.       Laboratories and Data:  Diagnostics:      Labs:   CBC:    Lab Results   Component Value Date    WBC 7.3 01/29/2025    WBC 9.3 01/28/2025    WBC 5.4 12/29/2014     Lab Results   Component Value Date    HEMOGLOBIN 14.3 08/23/2022    HEMOGLOBIN 14.6 01/03/2022    HGB 14.3 01/29/2025    HGB 15.3 01/28/2025    HGB 14.6 12/29/2014      Lab Results   Component Value Date    .0 01/29/2025    .0 01/28/2025     12/29/2014     BMP:   No results found for: \"GLUCOSE\"  Lab Results   Component Value Date    K 3.8 01/29/2025    K 3.6 01/28/2025    K 4.2 12/29/2014     Lab Results   Component Value Date    BUN 12 01/29/2025    BUN 15 01/28/2025    BUN 16 12/29/2014     Lab Results   Component Value Date    CREATSERUM 0.89 01/29/2025    CREATSERUM 1.05 01/28/2025     CREATSERUM 0.84 12/29/2014     Cholesterol:     Lab Results   Component Value Date    CHOLEST 197 12/29/2014     Lab Results   Component Value Date    HDL 54 12/29/2014     Lab Results   Component Value Date    TRIG 36 12/29/2014     Lab Results   Component Value Date     (H) 12/29/2014     Lab Results   Component Value Date    AST 18 01/28/2025    AST 14 (L) 12/29/2014     Lab Results   Component Value Date    ALT 21 01/28/2025    ALT 13 (L) 12/29/2014       Assessment/Plan:  44 year old male presenting with:    1) Malaise/anxiety  2) CAD s/p STEMI 1/2025 in [place] with PCI to LAD and OM  3) ICM (EF reported 45%)  4) Kawasaki disease  5) Celiac disease    - Patient presents feeling generally unwell/anxious for several days, symptoms distinctly difference from those at time of MI and PCI  - Trop negative x 4, ECG with SR and changes consistent with recent anterolateral MI  - TTE ordered and pending to assess LV structure/function  - Cont asa, prasugrel, statin, bb, arni, mra  - Discussed with patient that clinical presentation is not consistent with acute stent failure/ST (trop negative, patient comfortable and without symptoms); did offer stress testing if patient is so inclined, but objective testing thus far reassuring and patient asymptomatic since admission.  Patient and spouse declining stress test at this time  - Cardiac rehab referral  - Monitor on tele  - Will follow    Valentino Escobedo MD  Interventional cardiologist, [facility]  1/29/2025  1:45 PM         [1]   Allergies  Allergen Reactions    Gluten Meal NAUSEA AND VOMITING     Celiac    Other OTHER (SEE COMMENTS)     CATS   [2]   No current facility-administered medications on file prior to encounter.     Current Outpatient Medications on File Prior to Encounter   Medication Sig Dispense Refill    aspirin 81 MG Oral Chew Tab Chew 1 tablet (81 mg total) by mouth daily.      atorvastatin 80 MG Oral Tab Take 1 tablet (80 mg total) by mouth  nightly.      metoprolol succinate ER 50 MG Oral Tablet 24 Hr Take 0.5 tablets (25 mg total) by mouth daily.      prasugrel 10 MG Oral Tab Take 1 tablet (10 mg total) by mouth daily.      sacubitril-valsartan 49-51 MG Oral Tab Take 1 tablet by mouth 2 (two) times daily.      spironolactone 25 MG Oral Tab Take 1 tablet (25 mg total) by mouth daily.

## 2025-01-29 NOTE — PROGRESS NOTES
[facility]     Brian Gary Patient Status:  Inpatient    1980 MRN [id number]   Location [facility] 2W/SW Attending Kamlesh Todd MD   Hosp Day # 1 PCP Ling Ramos     Subjective:     Patient resting comfortably and in NAD. Denied any active complaints.   All questions and concerns addressed.      Objective:    Review of Systems:   ROS completed; pertinent positive and negatives stated in subjective.      Vital signs:  Temp:  [97.7 °F (36.5 °C)-99.1 °F (37.3 °C)] 99 °F (37.2 °C)  Pulse:  [] 68  Resp:  [11-21] 14  BP: ()/(55-89) 110/69  SpO2:  [94 %-100 %] 97 %      Physical Exam:    Gen: NAD AO x3  Chest: good air entry CTABL  CVS: normal s1 and s2 RR  Abd: NABS soft NT ND  Neuro: CN 2-12 grossly intact  Ext: no edema in bilateral LE      Diagnostic Data:    Labs:  Recent Labs   Lab 25  1719 25  0300   WBC 9.3 7.3   HGB 15.3 14.3   MCV 88.9 89.9   .0 211.0       Recent Labs   Lab 25  1719 25  0300   * 102*   BUN 15 12   CREATSERUM 1.05 0.89   CA 9.8 9.8   ALB 5.0*  --     141   K 3.6 3.8    107   CO2 22.0 23.0   ALKPHO 73  --    AST 18  --    ALT 21  --    BILT 0.7  --    TP 7.7  --        Estimated Creatinine Clearance: 105.9 mL/min (based on SCr of 0.89 mg/dL).    No results for input(s): \"PTP\", \"INR\" in the last 168 hours.           Imaging: Imaging data reviewed in Epic.    Medications:    metoprolol succinate ER  25 mg Oral Daily    aspirin  81 mg Oral Daily    atorvastatin  80 mg Oral Nightly    sacubitril-valsartan  1 tablet Oral BID    spironolactone  25 mg Oral Daily    prasugrel  10 mg Oral Daily       Assessment & Plan:     Generalized weakness  CAD s/p PCI  Imaging reviewed  Trop and EKG reviewed  Echo pending  Remains on heparin drip  Continue home meds  Cardiology on consult  Appreciate recs  HTN  HLD  Continue home meds      Plan of care discussed with  patient or family at bedside.      Supplementary Documentation:     Quality:  DVT Prophylaxis: Heparin   CODE status:       Estimated date of discharge: TBD  Discharge is dependent on: clinical stability  At this point Mr. Gary is expected to be discharge to: home                   Kamlesh Todd MD  Hospitalist    MDM: High, I personally reviewed the available laboratories, imaging including XR. I discussed the case with RN. I ordered laboratories, studies including AM labs.  Medical decision making high, risk is high.       The 21st Century Cures Act makes medical notes like these available to patients in the interest of transparency. Please be advised this is a medical document. Medical documents are intended to carry relevant information, facts as evident, and the clinical opinion of the practitioner. The medical note is intended as peer to peer communication and may appear blunt or direct. It is written in medical language and may contain abbreviations or verbiage that are unfamiliar.

## 2025-01-29 NOTE — DISCHARGE SUMMARY
[facility]  part of [facility]    DISCHARGE SUMMARY     Brian Gary Patient Status:  Inpatient    1980 MRN [id number]   Location [facility] 2W/SW Attending Kamlesh Todd MD   Hosp Day # 1 PCP Ling Ramos     Date of Admission: 2025  Date of Discharge:  2025    Discharge Disposition: Acute Care Short Term Hospital    Discharge Diagnosis:     Generalized weakness  CAD s/p PCI    History of Present Illness:     The patient is a 44-year-old  male with recent history of acute coronary syndrome while in [place], required intervention with 2 PCI stents early this month in 2025. Patient came in today to the emergency department because he has not been feeling well and fatigued for last 3 to 4 days. Initially at the urgent care center, he was found to have Q waves in the anterolateral leads. Sent to the emergency department for evaluation. EKG showed left axis deviation and PVCs with question of ST elevation. Initially cardiac alert was announced then it was canceled. CBC, chemistry, troponin were unremarkable. Chest x-ray showed no acute findings. Patient was initiated on IV heparin, and he will be kept for observation overnight.     Brief Synopsis:     Generalized weakness  CAD s/p PCI  Imaging reviewed  Trop and EKG reviewed  Echo pending  Remains on heparin drip  Continue home meds  Cardiology on consult  Close opt follow up  Cardiac rehab as opt  Patient declined stress testing during hospital stay  Patient will follow up with PCP and Cardiology as opt  Patient is to remain compliant with all discharge medications and instructions and to follow up as advised.   Patient encouraged to make healthy lifestyle and dietary changes.    Lace+ Score: 30  59-90 High Risk  29-58 Medium Risk  0-28   Low Risk       TCM Follow-Up Recommendation:  LACE 29-58: Moderate Risk of readmission after discharge from the hospital.      Procedures during hospitalization:    None    Incidental or significant findings and recommendations (brief descriptions):  None    Lab/Test results pending at Discharge:   None    Consultants:  Cardiology    Discharge Medication List:     Discharge Medications        CONTINUE taking these medications        Instructions Prescription details   aspirin 81 MG Chew      Chew 1 tablet (81 mg total) by mouth daily.   Refills: 0     atorvastatin 80 MG Tabs  Commonly known as: Lipitor      Take 1 tablet (80 mg total) by mouth nightly.   Refills: 0     metoprolol succinate ER 50 MG Tb24  Commonly known as: Toprol XL      Take 0.5 tablets (25 mg total) by mouth daily.   Refills: 0     prasugrel 10 MG Tabs  Commonly known as: Effient      Take 1 tablet (10 mg total) by mouth daily.   Refills: 0     sacubitril-valsartan 49-51 MG Tabs  Commonly known as: Entresto      Take 1 tablet by mouth 2 (two) times daily.   Refills: 0     spironolactone 25 MG Tabs  Commonly known as: Aldactone      Take 1 tablet (25 mg total) by mouth daily.   Refills: 0              [place]    Follow-up appointment:   Ling Ramos  [address] [facility] [address] [phone]    Follow up in 1 week(s)      Valentino Escobedo MD  [address] [id number] [phone]    Follow up in 1 week(s)      Appointments for Next 30 Days 1/29/2025 - 2/28/2025      None            Vital signs:  Temp:  [98.4 °F (36.9 °C)-99.1 °F (37.3 °C)] 98.6 °F (37 °C)  Pulse:  [] 90  Resp:  [11-20] 17  BP: ()/(55-89) 105/70  SpO2:  [94 %-99 %] 99 %    Physical Exam:    Gen: NAD AO x3  Chest: good air entry CTABL  CVS: normal s1 and s2 RR  Abd: NABS soft NT ND  Neuro: CN 2-12 grossly intact  Ext: no edema in bilateral LE    -----------------------------------------------------------------------------------------------  PATIENT DISCHARGE INSTRUCTIONS: See electronic chart    Kamlesh Todd MD  Hospitalist    Time spent:  > 30 minutes    The 21st Century Cures  Act makes medical notes like these available to patients in the interest of transparency. Please be advised this is a medical document. Medical documents are intended to carry relevant information, facts as evident, and the clinical opinion of the practitioner. The medical note is intended as peer to peer communication and may appear blunt or direct. It is written in medical language and may contain abbreviations or verbiage that are unfamiliar.

## 2025-01-29 NOTE — H&P
[facility]    PATIENT'S NAME: LEXI STEWART   ATTENDING PHYSICIAN: Perez Ceballos MD   PATIENT ACCOUNT#:   [id number]    LOCATION:  [place] [address]  MEDICAL RECORD #:   [id number]       YOB: 1980  ADMISSION DATE:       01/28/2025    HISTORY AND PHYSICAL EXAMINATION    CHIEF COMPLAINT:  Chest pain.    HISTORY OF PRESENT ILLNESS:  The patient is a 44-year-old  male with recent history of acute coronary syndrome while in [place], required intervention with 2 PCI stents early this month in January 2025.  Patient came in today to the emergency department because he has not been feeling well and fatigued for last 3 to 4 days.  Initially at the urgent care center, he was found to have Q waves in the anterolateral leads.  Sent to the emergency department for evaluation.  EKG showed left axis deviation and PVCs with question of ST elevation.  Initially cardiac alert was announced then it was canceled.  CBC, chemistry, troponin were unremarkable.  Chest x-ray showed no acute findings.  Patient was initiated on IV heparin, and he will be kept for observation overnight.    PAST MEDICAL HISTORY:  Recent coronary artery disease with PCI, possibly to the left circumflex and diagonal; mild ischemic cardiomyopathy; hypertension; hyperlipidemia.  History of Kawasaki disease as a child and celiac disease.    PAST SURGICAL HISTORY:  Hernia repair.    MEDICATIONS:  Please see medication reconciliation list.     ALLERGIES:  No known drug allergies.    FAMILY HISTORY:  Positive for coronary artery disease.  Mother had kidney disease.    SOCIAL HISTORY:  No tobacco or drug use.  Occasional alcohol.     REVIEW OF SYSTEMS:  Patient denies any chest pain in last 3 to 4 days, but he has been feeling fatigued and tired.  He denies any nausea, vomiting, or diaphoresis.  No syncope.  Other 12-point review of systems is negative.       PHYSICAL EXAMINATION:    GENERAL:  Alert and oriented to time, place, and  person.  Anxious, no acute distress.  VITAL SIGNS:  Temperature 98.1, pulse 89, respiratory rate 16, blood pressure 115/84, pulse ox 100% on room air.    HEENT:  Atraumatic.  Oropharynx clear.  Dry mucous membranes.  Ears, nose normal.  Eyes:  Anicteric sclerae.   NECK:  Supple.  No lymphadenopathy.  Trachea midline.  Full range of motion.  LUNGS:  Clear to auscultation bilaterally.  Normal respiratory effort.    HEART:  Regular rate, rhythm.  S1 and S2 auscultated.  No murmur.  ABDOMEN:  Soft, nondistended.  No tenderness.  Positive bowel sounds.    EXTREMITIES:  No peripheral edema, clubbing, or cyanosis.  NEUROLOGIC:  Motor and sensory intact.    ASSESSMENT AND PLAN:  Vague symptoms of fatigue with underlying coronary artery disease; abnormal EKG, possible baseline, indicative of recent anterior and lateral myocardial injury.  Patient will be admitted to the hospital overnight for observation.  Continue IV heparin and continue to trend troponin level.  Obtain cardiology consult.  2D echocardiogram.  Further recommendations to follow.     Dictated By Radhika Colon MD  d: 01/28/2025 18:09:02  t: 01/28/2025 18:47:07  Job [id number]  FB/

## 2025-01-29 NOTE — PLAN OF CARE
Pt a&ox4 on RA over PM, denying anymore chest pain, on hep gtt, NSR overnight. Pt and family updated on POC, all questions answered.   Problem: Patient Centered Care  Goal: Patient preferences are identified and integrated in the patient's plan of care  Description: Interventions:  - What would you like us to know as we care for you? From home with family  - Provide timely, complete, and accurate information to patient/family  - Incorporate patient and family knowledge, values, beliefs, and cultural backgrounds into the planning and delivery of care  - Encourage patient/family to participate in care and decision-making at the level they choose  - Honor patient and family perspectives and choices  Outcome: Progressing     Problem: Patient/Family Goals  Goal: Patient/Family Long Term Goal  Description: Patient's Long Term Goal: improve or maintain cardiac health    Interventions:  - medication, diet and lifestyle modification  - See additional Care Plan goals for specific interventions  Outcome: Progressing  Goal: Patient/Family Short Term Goal  Description: Patient's Short Term Goal: go home    Interventions:   - medication, labs and diagnostics  - See additional Care Plan goals for specific interventions  Outcome: Progressing     Problem: CARDIOVASCULAR - ADULT  Goal: Maintains optimal cardiac output and hemodynamic stability  Description: INTERVENTIONS:  - Monitor vital signs, rhythm, and trends  - Monitor for bleeding, hypotension and signs of decreased cardiac output  - Evaluate effectiveness of vasoactive medications to optimize hemodynamic stability  - Monitor arterial and/or venous puncture sites for bleeding and/or hematoma  - Assess quality of pulses, skin color and temperature  - Assess for signs of decreased coronary artery perfusion - ex. Angina  - Evaluate fluid balance, assess for edema, trend weights  Outcome: Progressing  Goal: Absence of cardiac arrhythmias or at baseline  Description:  INTERVENTIONS:  - Continuous cardiac monitoring, monitor vital signs, obtain 12 lead EKG if indicated  - Evaluate effectiveness of antiarrhythmic and heart rate control medications as ordered  - Initiate emergency measures for life threatening arrhythmias  - Monitor electrolytes and administer replacement therapy as ordered  Outcome: Progressing

## 2025-01-30 NOTE — PLAN OF CARE
Problem: Patient Centered Care  Goal: Patient preferences are identified and integrated in the patient's plan of care  Description: Interventions:  - What would you like us to know as we care for you?   - Provide timely, complete, and accurate information to patient/family  - Incorporate patient and family knowledge, values, beliefs, and cultural backgrounds into the planning and delivery of care  - Encourage patient/family to participate in care and decision-making at the level they choose  - Honor patient and family perspectives and choices  Outcome: Adequate for Discharge     Problem: CARDIOVASCULAR - ADULT  Goal: Maintains optimal cardiac output and hemodynamic stability  Description: INTERVENTIONS:  - Monitor vital signs, rhythm, and trends  - Monitor for bleeding, hypotension and signs of decreased cardiac output  - Evaluate effectiveness of vasoactive medications to optimize hemodynamic stability  - Monitor arterial and/or venous puncture sites for bleeding and/or hematoma  - Assess quality of pulses, skin color and temperature  - Assess for signs of decreased coronary artery perfusion - ex. Angina  - Evaluate fluid balance, assess for edema, trend weights  Outcome: Adequate for Discharge  Goal: Absence of cardiac arrhythmias or at baseline  Description: INTERVENTIONS:  - Continuous cardiac monitoring, monitor vital signs, obtain 12 lead EKG if indicated  - Evaluate effectiveness of antiarrhythmic and heart rate control medications as ordered  - Initiate emergency measures for life threatening arrhythmias  - Monitor electrolytes and administer replacement therapy as ordered  Outcome: Adequate for Discharge   Pt discharged home. Pt did not see cardiac rehab prior to discharge. Pt prefers to go home today and call to make an appointment with cardiac rehab outpatient. Discussed with Dr Escobedo and Dr Todd. RN discussed meds and follow-up with pt. He verbalized understanding. No new prescriptions. IVs removed.  Pt's son will provide transportation home. Pt requested to walk off unit. Pt left with family.

## (undated) NOTE — LETTER
Date & Time: 1/3/2022, 6:51 PM  Patient: Akhil Smith  Encounter Provider(s):    JAYESH Mcmillan       To Whom It May Concern:    Akhil Smith was seen and treated in our department on 1/3/2022. He can return to work 01/05/2021 due to strep.  Natalie Mcrae